# Patient Record
Sex: FEMALE | Race: ASIAN
[De-identification: names, ages, dates, MRNs, and addresses within clinical notes are randomized per-mention and may not be internally consistent; named-entity substitution may affect disease eponyms.]

---

## 2019-03-09 ENCOUNTER — HOSPITAL ENCOUNTER (EMERGENCY)
Dept: HOSPITAL 74 - JERFT | Age: 39
Discharge: HOME | End: 2019-03-09
Payer: COMMERCIAL

## 2019-03-09 VITALS — DIASTOLIC BLOOD PRESSURE: 87 MMHG | SYSTOLIC BLOOD PRESSURE: 141 MMHG | TEMPERATURE: 98.3 F | HEART RATE: 90 BPM

## 2019-03-09 VITALS — BODY MASS INDEX: 39 KG/M2

## 2019-03-09 DIAGNOSIS — S46.811A: Primary | ICD-10-CM
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
## 2019-03-09 DIAGNOSIS — M94.0: ICD-10-CM
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
## 2019-03-09 NOTE — PDOC
History of Present Illness





- General


Chief Complaint: Pain


Stated Complaint: RT BODYACHES


Time Seen by Provider: 03/09/19 16:47


History Source: Patient


Exam Limitations: Clinical Condition





- History of Present Illness


Initial Comments: 





03/09/19 17:27


Patient with no significant past medical history present with complaint of 4 

day history of right anterior chest wall pain and posterior shoulder pain with 

pain to lateral side of right arm which is worse when  movement. Patient 

reported increased pain with deep breathing to the right side chest. Denies any 

trauma or injury to chest and shoulder. Patient reported intermittent shortness 

of breath from pain. Denies numbness and tingling sensation. Denies chest pain 

now, nausea, vomiting or shortness of breath now. Patient did not take anything 

for pain


Timing/Duration: other (4 days)





Past History





- Past Medical History


Allergies/Adverse Reactions: 


 Allergies











Allergy/AdvReac Type Severity Reaction Status Date / Time


 


No Known Allergies Allergy   Verified 03/09/19 16:21











Home Medications: 


Ambulatory Orders





Methocarbamol [Robaxin -] 500 mg PO BID #14 tablet 03/09/19 


Naproxen 500 mg PO BID PRN #20 tablet 03/09/19 








COPD: No





- Suicide/Smoking/Psychosocial Hx


Smoking History: Never smoked





**Review of Systems





- Review of Systems


Able to Perform ROS?: Yes


Is the patient limited English proficient: No


Constitutional: No: Malaise, Weakness


HEENTM: No: Symptoms Reported, See HPI, Eye Pain, Blurred Vision, Tearing, 

Recent change in vision, Double Vision, Cataracts, Ear Pain, Ocular Prothesis, 

Ear Discharge, Nose Pain, Nose Congestion, Tinnitus, Nose Bleeding, Hearing Loss

, Throat Pain, Throat Swelling, Mouth Pain, Dental Problems, Difficulty 

Swallowing, Mouth Swelling, Other


Respiratory: No: Symptoms reported, See HPI, Cough, Orthopnea, Shortness of 

Breath, SOB with Exertion, SOB at Rest, Stridor, Wheezing, Productive cough, 

Hemoptysis, Other


Cardiac (ROS): Yes: See HPI, Other (right side chest wall pain).  No: Chest Pain

, Irregular Heart Rate, Lightheadedness, Palpitations, Syncope, Chest Tightness


ABD/GI: No: Nausea, Vomiting


Musculoskeletal: Yes: See HPI, Back Pain (upper back on right side ), Muscle 

Pain (posterior right shoulder and right anterior chest wall), Other (right 

upper arm pain).  No: Joint Stiffness


Neurological: No: Headache, Numbness, Paresthesia, Tingling, Weakness, Dizziness


All Other Systems: Reviewed and Negative





*Physical Exam





- Vital Signs


 Last Vital Signs











Temp Pulse Resp BP Pulse Ox


 


 98.3 F   90   16   141/87   98 


 


 03/09/19 16:24  03/09/19 16:24  03/09/19 16:24  03/09/19 16:24  03/09/19 16:24














- Physical Exam


Comments: 





03/09/19 17:31


GENERAL:


Well developed, well nourished. Awake and alert. No acute distress.


CARDIOVASCULAR:


Regular rate and rhythm. No murmurs, rubs, or gallops. 


PULMONARY: 


No evidence of respiratory distress. Lungs clear to auscultation bilaterally. 

No wheezing, rales or rhonchi.


ABDOMINAL:


Soft. Non-tender. Non-distended. No rebound or guarding. No organomegaly. 

Normoactive bowel sounds


MUSCULOSKELETAL : mild tenderness over posterior right shoulder and anterior 

lateral second costochondral region on the right side of her anterior chest. 

Mild tenderness over lateral deltoid muscle of right arm. No bony deformities 


EXTREMITIES: 


No cyanosis. No clubbing. No edema. No calf tenderness.


SKIN: 


Warm and dry. Normal capillary refill. No rashes. No jaundice. 


NEUROLOGICAL: 


Alert, awake, appropriate.  No motor deficits in the  lower extremities.  Gait 

is normal without ataxia.


PSYCHIATRIC: 


Cooperative. Good eye contact. Appropriate mood and affect.


General Appearance: Yes: Nourished, Appropriately Dressed.  No: Apparent 

Distress





Moderate Sedation





- Procedure Monitoring


Vital Signs: 


Procedure Monitoring Vital Signs











Temperature  98.3 F   03/09/19 16:24


 


Pulse Rate  90   03/09/19 16:24


 


Respiratory Rate  16   03/09/19 16:24


 


Blood Pressure  141/87   03/09/19 16:24


 


O2 Sat by Pulse Oximetry (%)  98   03/09/19 16:24











ED Treatment Course





- RADIOLOGY


Radiology Studies Ordered: 














 Category Date Time Status


 


 CHEST PA & LAT [RAD] Stat Radiology  03/09/19 17:06 Ordered


 


 SHOULDER-RIGHT [RAD] Stat Radiology  03/09/19 17:06 Ordered














Medical Decision Making





- Medical Decision Making





03/09/19 18:17


Patient with no sick or past medical history present with complaint of 4 day 

history of right-sided chest wall, right upper back and right shoulder pain 

without trauma or injury. Patient reported increased pain with deep breathing 

to upper back and chest. Reported increased pain to right shoulder with 

elevation of right upper arm.


Exam significant for mild reproducible right-sided chest wall tenderness with 

mild tenderness to posterior right shoulder and lateral deltoid muscle in no 

acute distress. Normal cardio exam. Lungs clear to auscultation bilateral. 

Symptoms likely muscle skeletal sprain.


X-ray of right shoulder and chest shows no acute pathology. Patient stable for 

discharge on naproxen as needed for pain with follow-up in 2 days with PCP for 

reassessment.





*DC/Admit/Observation/Transfer


Diagnosis at time of Disposition: 


 Costochondral chest pain





Strain of right shoulder


Qualifiers:


 Encounter type: initial encounter Qualified Code(s): S46.911A - Strain of 

unspecified muscle, fascia and tendon at shoulder and upper arm level, right arm

, initial encounter








- Discharge Dispostion


Disposition: HOME


Condition at time of disposition: Stable


Decision to Admit order: No





- Prescriptions


Prescriptions: 


Methocarbamol [Robaxin -] 500 mg PO BID #14 tablet


Naproxen 500 mg PO BID PRN #20 tablet


 PRN Reason: pain





- Referrals


Referrals: 


Marty Moy MD [Primary Care Provider] - 





- Patient Instructions


Printed Discharge Instructions:  [facility] for Costochondritis


Additional Instructions: 


Your x-ray was normal. Your symptoms likely from muscle strain. Take prescribed 

medication as needed for pain. Follow-up with primary care as soon as possible 

for reassessment.





- Post Discharge Activity

## 2019-12-21 ENCOUNTER — HOSPITAL ENCOUNTER (EMERGENCY)
Dept: HOSPITAL 74 - JERFT | Age: 39
Discharge: HOME | End: 2019-12-21
Payer: COMMERCIAL

## 2019-12-21 VITALS — DIASTOLIC BLOOD PRESSURE: 78 MMHG | HEART RATE: 97 BPM | SYSTOLIC BLOOD PRESSURE: 134 MMHG | TEMPERATURE: 97.9 F

## 2019-12-21 VITALS — BODY MASS INDEX: 40.2 KG/M2

## 2019-12-21 DIAGNOSIS — M26.621: Primary | ICD-10-CM

## 2019-12-21 NOTE — PDOC
History of Present Illness





- General


Chief Complaint: Ear Problem


Stated Complaint: RT. EAR PAIN


Time Seen by Provider: 12/21/19 11:06


History Source: Patient


Exam Limitations: Clinical Condition





- History of Present Illness


Initial Comments: 





12/21/19 11:28


Patient with no significant past medical history present with complaint of 

right ear pain and pain in front of right ear for 1 week.  Denies fever, 

decreased hearing, headache, dizziness, nausea or vomiting.  Patient did not 

take anything for symptoms


Is this a multiple visit Asthma Patient?: No


Timing/Duration: 1 week





Past History





- Past Medical History


Allergies/Adverse Reactions: 


 Allergies











Allergy/AdvReac Type Severity Reaction Status Date / Time


 


No Known Allergies Allergy   Verified 03/09/19 16:21











Home Medications: 


Ambulatory Orders





Methocarbamol [Robaxin -] 500 mg PO BID #14 tablet 03/09/19 


Naproxen 500 mg PO BID PRN #20 tablet 12/21/19 








COPD: No





- Psycho Social/Smoking Cessation Hx


Smoking History: Never smoked





**Review of Systems





- Review of Systems


Able to Perform ROS?: Yes


Is the patient limited English proficient: No


Constitutional: No: Chills, Fever, Malaise


HEENTM: Yes: Symptoms Reported, See HPI, Ear Pain (right ear).  No: Eye Pain, 

Blurred Vision, Tearing, Recent change in vision, Double Vision, Cataracts, 

Ocular Prothesis, Ear Discharge, Nose Pain, Nose Congestion, Tinnitus, Nose 

Bleeding, Hearing Loss, Throat Pain, Throat Swelling, Mouth Pain, Dental 

Problems, Difficulty Swallowing, Mouth Swelling, Other


Respiratory: No: Symptoms reported, See HPI, Cough, Orthopnea, Shortness of 

Breath, SOB with Exertion, SOB at Rest, Stridor, Wheezing, Productive cough, 

Hemoptysis, Other


Cardiac (ROS): No: Symptoms Reported, See HPI, Chest Pain, Edema, Irregular 

Heart Rate, Lightheadedness, Palpitations, Syncope, Chest Tightness, Other


ABD/GI: No: Symptoms Reported, Nausea, Vomiting


Musculoskeletal: No: Symptoms Reported


Integumentary: No: Symptoms Reported


Neurological: No: Symptoms reported, Headache, Numbness, Dizziness


All Other Systems: Reviewed and Negative





*Physical Exam





- Vital Signs


 Last Vital Signs











Temp Pulse Resp BP Pulse Ox


 


 97.9 F   97 H  20   134/78   99 


 


 12/21/19 10:40  12/21/19 10:40  12/21/19 10:40  12/21/19 10:40  12/21/19 10:40














- Physical Exam





12/21/19 11:31


GENERAL:


Well developed, well nourished. Awake and alert. No acute distress.


HEENT: Bilateral ear canals nonerythematous.  Tympanic membrane normal 

bilateral.  Normocephalic, atraumatic. PERRLA, EOMI. No conjunctival pallor. 

Sclera are non-icteric. Moist mucous membranes. Oropharynx is clear.mild 

tenderness to right TMJ which is worse with occlusion of mouth against 

resistance.  


NECK: 


Supple. Full ROM. 


CARDIOVASCULAR:


Regular rate and rhythm. No murmurs, rubs, or gallops. 


PULMONARY: 


No evidence of respiratory distress. Lungs clear to auscultation bilaterally. 

No wheezing, rales or rhonchi.


MUSCULOSKELETAL 


Normal range of motion at all joints. mild tenderness to right TMJ which is 

worse with occlusion of mouth against resistance.  


SKIN: 


Warm and dry. Normal capillary refill. No rashes. 


NEUROLOGICAL: 


Alert, awake, appropriate.  Gait is normal without ataxia.


PSYCHIATRIC: 


Cooperative. Good eye contact. Appropriate mood


General Appearance: Yes: Nourished, Appropriately Dressed.  No: Apparent 

Distress





Medical Decision Making





- Medical Decision Making





12/21/19 11:29


Patient with no significant past medical history present with complaint of 

right ear pain and pain in front of right ear for 1 week.  Denies fever, 

decreased hearing, headache, dizziness, nausea or vomiting.  Patient did not 

take anything for symptoms


Exam significant for mild tenderness to right TMJ which is worse with occlusion 

of mouth against resistance.  Bilateral ear canals normal with no erythema.  

Tympanic membrane normal bilateral.  Patient afebrile.


Patient symptoms likely TMJ arthralgia.  Naproxen 500 mg p.o. ordered for pain.

  Patient stable for discharge on naproxen as needed for pain with advised to 

do hot compress and follow-up with oral surgery





Discharge





- Discharge Information


Problems reviewed: Yes


Clinical Impression/Diagnosis: 


 Arthralgia of right temporomandibular joint





Condition: Stable


Disposition: HOME





- Admission


No





- Additional Discharge Information


Prescriptions: 


Naproxen 500 mg PO BID PRN #20 tablet


 PRN Reason: pain





- Follow up/Referral


Referrals: 


Nehemias Mata [Non Staff, Medical] - 2 Days





- Patient Discharge Instructions


Patient Printed Discharge Instructions:  [facility] for Temporomandibular Disorder


Additional Instructions: 


Your symptoms is TMJ which is inflammation of the jawline.  Take prescribed 

medication as needed for pain apply hot compress to jaw 2-3 times a day as 

needed for pain.  Follow-up referred dentist if no improvement in 3 days





- Post Discharge Activity

## 2020-03-27 ENCOUNTER — HOSPITAL ENCOUNTER (EMERGENCY)
Dept: HOSPITAL 74 - JER | Age: 40
Discharge: HOME | End: 2020-03-27
Payer: COMMERCIAL

## 2020-03-27 VITALS — TEMPERATURE: 97.9 F | DIASTOLIC BLOOD PRESSURE: 76 MMHG | HEART RATE: 90 BPM | SYSTOLIC BLOOD PRESSURE: 131 MMHG

## 2020-03-27 VITALS — BODY MASS INDEX: 38.4 KG/M2

## 2020-03-27 DIAGNOSIS — R06.02: Primary | ICD-10-CM

## 2020-03-27 DIAGNOSIS — M54.6: ICD-10-CM

## 2020-03-27 LAB
ANISOCYTOSIS BLD QL: (no result)
BASOPHILS # BLD: 1.6 % (ref 0–2)
DEPRECATED RDW RBC AUTO: 19.8 % (ref 11.6–15.6)
EOSINOPHIL # BLD: 2.8 % (ref 0–4.5)
HCT VFR BLD CALC: 34 % (ref 32.4–45.2)
HGB BLD-MCNC: 10.6 GM/DL (ref 10.7–15.3)
LYMPHOCYTES # BLD: 33.4 % (ref 8–40)
MACROCYTES BLD QL: (no result)
MCH RBC QN AUTO: 20.6 PG (ref 25.7–33.7)
MCHC RBC AUTO-ENTMCNC: 31.2 G/DL (ref 32–36)
MCV RBC: 66 FL (ref 80–96)
MONOCYTES # BLD AUTO: 14.3 % (ref 3.8–10.2)
NEUTROPHILS # BLD: 47.9 % (ref 42.8–82.8)
OVALOCYTES BLD QL SMEAR: (no result)
PLATELET # BLD AUTO: 400 K/MM3 (ref 134–434)
PLATELET BLD QL SMEAR: ADEQUATE
PMV BLD: 8.8 FL (ref 7.5–11.1)
RBC # BLD AUTO: 5.14 M/MM3 (ref 3.6–5.2)
TARGETS BLD QL SMEAR: (no result)
WBC # BLD AUTO: 5 K/MM3 (ref 4–10)

## 2020-03-27 NOTE — PDOC
History of Present Illness





- General


Chief Complaint: Shortness of Breath


Stated Complaint: BACK PAIN


Time Seen by Provider: 03/27/20 18:44


History Source: Patient


Exam Limitations: Clinical Condition





- History of Present Illness


Initial Comments: 





03/27/20 19:12


Patient with no significant past medical history present with complaint of 2 

days history of upper back and mid sternal chest pain with shortness of breath. 

Denies palpitation, nausea, vomiting, fever, dizziness, weakness.  Patient has 

not taken anything for pain.  Denies any other symptoms.  Denies sick contact or

recent travel or known contact with anybody with covid positive


Is this a multiple visit Asthma Patient?: No


Timing/Duration: other (2 days)





Past History





- Past Medical History


Allergies/Adverse Reactions: 


                                    Allergies











Allergy/AdvReac Type Severity Reaction Status Date / Time


 


No Known Allergies Allergy   Verified 03/27/20 18:26











Home Medications: 


Ambulatory Orders





Methocarbamol [Robaxin -] 500 mg PO BID #14 tablet 03/27/20 


Methylprednisolone [Medrol Dose Ty] 4 mg PO ASDIR #21 tablet 03/27/20 








COPD: No





- Psycho Social/Smoking Cessation Hx


Smoking History: Never smoked


Hx Alcohol Use: No


Drug/Substance Use Hx: No





**Review of Systems





- Review of Systems


Able to Perform ROS?: Yes


Is the patient limited English proficient: No


Constitutional: No: Chills, Fever, Malaise


HEENTM: No: Symptoms Reported, See HPI, Eye Pain, Blurred Vision, Tearing, 

Recent change in vision, Double Vision, Cataracts, Ear Pain, Ocular Prothesis, 

Ear Discharge, Nose Pain, Nose Congestion, Tinnitus, Nose Bleeding, Hearing 

Loss, Throat Pain, Throat Swelling, Mouth Pain, Dental Problems, Difficulty 

Swallowing, Mouth Swelling, Other


Respiratory: Yes: Symptoms reported, See HPI, Shortness of Breath, SOB at Rest. 

No: Cough, Orthopnea, SOB with Exertion, Stridor, Wheezing, Productive cough, 

Hemoptysis, Other


Cardiac (ROS): Yes: Symptoms Reported, See HPI, Chest Pain (intermittent 

midsternal pain).  No: Edema, Irregular Heart Rate, Lightheadedness, Palpitat

ions, Syncope, Chest Tightness, Other


ABD/GI: No: Symptoms Reported, See HPI, Abdominal Distended, Abd. Pain w/ 

defecation, Blood Streaked Bowels, Constipated, Diarrhea, Difficulty Swallowing,

Nausea, Poor Appetite, Poor Fluid Intake, Rectal Bleeding, Vomiting, 

Indigestion, Abdominal cramping, Tarry Stools, Other


Musculoskeletal: Yes: Symptoms Reported, See HPI, Back Pain (upper back pain)


Integumentary: No: Symptoms Reported


All Other Systems: Reviewed and Negative





*Physical Exam





- Vital Signs


                                Last Vital Signs











Temp Pulse Resp BP Pulse Ox


 


 99.0 F   110 H  16   141/91   100 


 


 03/27/20 18:13  03/27/20 18:13  03/27/20 18:13  03/27/20 18:13  03/27/20 18:13














- Physical Exam





03/27/20 19:17


GENERAL:


Well developed, well nourished. Awake and alert. No acute distress.


HEENT:  Normocephalic, atraumatic. PERRLA, EOMI. No conjunctival pallor. Sclera 

are non-icteric. Moist mucous membranes. Oropharynx is clear.


NECK: 


Supple. Full ROM. 


CARDIOVASCULAR:


Regular rate and rhythm. No murmurs, rubs, or gallops. Distal pulses are 2+ and 

symmetric. 


PULMONARY: 


No evidence of respiratory distress. Lungs clear to auscultation bilaterally. No

wheezing, rales or rhonchi.


ABDOMINAL:


Soft. Non-tender. Non-distended. No rebound or guarding. No organomegaly. 

Normoactive bowel sounds. 


MUSCULOSKELETAL 


Normal range of motion at all joints.  Mild bilateral paravertebral muscle of 

posterior thoracic spine of T2-C4.  No midline tenderness


SKIN: 


Warm and dry. Normal capillary refill. No rashes. No cyanosis. 


NEUROLOGICAL: 


Alert, awake, appropriate.  Gait is normal without ataxia.


PSYCHIATRIC: 


Cooperative. Good eye contact. Appropriate mood


General Appearance: Yes: Nourished, Appropriately Dressed.  No: Apparent 

Distress





ED Treatment Course





- LABORATORY


CBC & Chemistry Diagram: 


                                 03/27/20 19:05








- RADIOLOGY


Radiology Studies Ordered: 














 Category Date Time Status


 


 CHEST PA & LAT [RAD] Stat Radiology  03/27/20 18:53 Ordered














Medical Decision Making





- Medical Decision Making





03/27/20 19:14


Patient with no significant past medical history present with complaint of 2 

days history of upper back and mid sternal chest pain with shortness of breath. 

 Denies palpitation, nausea, vomiting, fever, dizziness, weakness.  Patient has 

not taken anything for pain.  Denies any other symptoms.  Denies sick contact or

 recent travel or known contact with anybody with covid positive





Exam significant for mild point tenderness to bilateral paravertebral muscle of 

upper thoracic spine T2-T4.  No midline tenderness.  Lungs clear to auscultation

 bilateral.  Normal cardio exam.  Patient in no acute distress.


Patient symptoms likely musculoskeletal pain versus less likely cardiogenic 

symptoms.


We will do EKG, chest x-ray and cardiac profile to rule out cardiogenic 

symptoms.  Tylenol 650 mg p.o. ordered for pain and Robaxin 500 mg p.o. ordered 

for possible spasm.  Treat based on lab and imaging results


03/27/20 21:33


 CBC and cardiac profile lab wnl. EKG shows NSR. CXR shows no acute abnormality.

 pt symptoms likely MSK pain causing SOB due to pain


pt stable for discharge on robaxin prn for spasm and medrol-ty for anti-

inflammatory effect with strict f/u instructions





Discharge





- Discharge Information


Problems reviewed: Yes


Clinical Impression/Diagnosis: 


 Dorsalgia of cervicothoracic region, SOB (shortness of breath)





Condition: Stable


Disposition: HOME





- Admission


No





- Additional Discharge Information


Prescriptions: 


Methylprednisolone [Medrol Dose Ty] 4 mg PO ASDIR #21 tablet


Methocarbamol [Robaxin -] 500 mg PO BID #14 tablet





- Follow up/Referral


Referrals: 


Marty Moy MD [Primary Care Provider] - 





- Patient Discharge Instructions


Patient Printed Discharge Instructions:  [facility] for Thoracic Back Pain


Additional Instructions: 


Your chest x-ray was normal.  Your blood work was normal.  The symptoms likely 

caused by muscle pain.  Take prescribed medication as prescribed for pain.  Inc

rease fluid intake and rest.  Come back to emergency room if worsening shortness

of breath, chest pain, weakness with nausea and vomiting





- Post Discharge Activity

## 2020-03-30 NOTE — EKG
Test Reason : 

Blood Pressure : ***/*** mmHG

Vent. Rate : 091 BPM     Atrial Rate : 091 BPM

   P-R Int : 124 ms          QRS Dur : 084 ms

    QT Int : 356 ms       P-R-T Axes : 054 057 043 degrees

   QTc Int : 437 ms

 

NORMAL SINUS RHYTHM

POSSIBLE LEFT ATRIAL ENLARGEMENT

BORDERLINE ECG

NO PREVIOUS ECGS AVAILABLE

Confirmed by FLY AARON MD ([id number]) on 3/30/2020 2:54:08 PM

 

Referred By:             Confirmed By:FLY AARON MD

## 2020-12-12 ENCOUNTER — HOSPITAL ENCOUNTER (EMERGENCY)
Dept: HOSPITAL 74 - JER | Age: 40
Discharge: HOME | End: 2020-12-12
Payer: COMMERCIAL

## 2020-12-12 VITALS — DIASTOLIC BLOOD PRESSURE: 71 MMHG | TEMPERATURE: 98.1 F | HEART RATE: 84 BPM | SYSTOLIC BLOOD PRESSURE: 122 MMHG

## 2020-12-12 VITALS — BODY MASS INDEX: 32 KG/M2

## 2020-12-12 DIAGNOSIS — J01.20: Primary | ICD-10-CM

## 2020-12-12 PROCEDURE — 3E033GC INTRODUCTION OF OTHER THERAPEUTIC SUBSTANCE INTO PERIPHERAL VEIN, PERCUTANEOUS APPROACH: ICD-10-PCS | Performed by: NURSE PRACTITIONER

## 2020-12-12 PROCEDURE — 3E0337Z INTRODUCTION OF ELECTROLYTIC AND WATER BALANCE SUBSTANCE INTO PERIPHERAL VEIN, PERCUTANEOUS APPROACH: ICD-10-PCS | Performed by: NURSE PRACTITIONER

## 2020-12-12 PROCEDURE — 3E033NZ INTRODUCTION OF ANALGESICS, HYPNOTICS, SEDATIVES INTO PERIPHERAL VEIN, PERCUTANEOUS APPROACH: ICD-10-PCS | Performed by: NURSE PRACTITIONER

## 2021-07-05 ENCOUNTER — HOSPITAL ENCOUNTER (EMERGENCY)
Dept: HOSPITAL 74 - JERFT | Age: 41
Discharge: HOME | End: 2021-07-05
Payer: COMMERCIAL

## 2021-07-05 VITALS — DIASTOLIC BLOOD PRESSURE: 83 MMHG | SYSTOLIC BLOOD PRESSURE: 129 MMHG | HEART RATE: 97 BPM | TEMPERATURE: 97 F

## 2021-07-05 VITALS — BODY MASS INDEX: 38.4 KG/M2

## 2021-07-05 DIAGNOSIS — G44.209: Primary | ICD-10-CM

## 2021-07-05 PROCEDURE — U0005 INFEC AGEN DETEC AMPLI PROBE: HCPCS

## 2021-07-05 PROCEDURE — C9803 HOPD COVID-19 SPEC COLLECT: HCPCS

## 2021-07-05 PROCEDURE — U0003 INFECTIOUS AGENT DETECTION BY NUCLEIC ACID (DNA OR RNA); SEVERE ACUTE RESPIRATORY SYNDROME CORONAVIRUS 2 (SARS-COV-2) (CORONAVIRUS DISEASE [COVID-19]), AMPLIFIED PROBE TECHNIQUE, MAKING USE OF HIGH THROUGHPUT TECHNOLOGIES AS DESCRIBED BY CMS-2020-01-R: HCPCS

## 2021-10-29 ENCOUNTER — HOSPITAL ENCOUNTER (EMERGENCY)
Dept: HOSPITAL 74 - JER | Age: 41
Discharge: HOME | End: 2021-10-29
Payer: COMMERCIAL

## 2021-10-29 VITALS — SYSTOLIC BLOOD PRESSURE: 144 MMHG | TEMPERATURE: 99.5 F | DIASTOLIC BLOOD PRESSURE: 88 MMHG | HEART RATE: 97 BPM

## 2021-10-29 VITALS — BODY MASS INDEX: 38.4 KG/M2

## 2021-10-29 DIAGNOSIS — R51.9: Primary | ICD-10-CM

## 2021-10-29 PROCEDURE — 3E0333Z INTRODUCTION OF ANTI-INFLAMMATORY INTO PERIPHERAL VEIN, PERCUTANEOUS APPROACH: ICD-10-PCS

## 2021-10-29 PROCEDURE — 3E033NZ INTRODUCTION OF ANALGESICS, HYPNOTICS, SEDATIVES INTO PERIPHERAL VEIN, PERCUTANEOUS APPROACH: ICD-10-PCS
